# Patient Record
Sex: FEMALE | Race: WHITE
[De-identification: names, ages, dates, MRNs, and addresses within clinical notes are randomized per-mention and may not be internally consistent; named-entity substitution may affect disease eponyms.]

---

## 2020-04-17 ENCOUNTER — HOSPITAL ENCOUNTER (EMERGENCY)
Dept: HOSPITAL 35 - ER | Age: 55
Discharge: HOME | End: 2020-04-17
Payer: COMMERCIAL

## 2020-04-17 VITALS — SYSTOLIC BLOOD PRESSURE: 149 MMHG | DIASTOLIC BLOOD PRESSURE: 88 MMHG

## 2020-04-17 VITALS — HEIGHT: 63 IN | WEIGHT: 139.99 LBS | BODY MASS INDEX: 24.8 KG/M2

## 2020-04-17 VITALS — DIASTOLIC BLOOD PRESSURE: 72 MMHG | SYSTOLIC BLOOD PRESSURE: 129 MMHG

## 2020-04-17 DIAGNOSIS — Z79.899: ICD-10-CM

## 2020-04-17 DIAGNOSIS — Z98.890: ICD-10-CM

## 2020-04-17 DIAGNOSIS — M75.31: Primary | ICD-10-CM

## 2020-04-17 DIAGNOSIS — Z88.0: ICD-10-CM

## 2020-04-17 DIAGNOSIS — F17.210: ICD-10-CM

## 2020-04-17 DIAGNOSIS — I10: ICD-10-CM

## 2020-04-17 NOTE — EKG
[facility]
[place]   [address]                     ELECTROCARDIOGRAM REPORT      
_______________________________________________________________________________
 
Name:       LOW REZA              Room #:         170-9       ADM IN  
M.R.#:      [id number]                       Account #:      [id number]  
Admission:  20    Attend Phys:    Tomás Eric MD     
Discharge:              Date of Birth:  [birth year]  
                                                          Report #: [id number]
                                                                    [id number]
_______________________________________________________________________________
THIS REPORT FOR:  
 
cc:  Eloisa Berman MD Mcrae, Jennifer A, MD Couchonnal, Luis F. MD                                            ~
THIS REPORT FOR:   //name//                          
 
                         [facility] ED
                                       
Test Date:    2020               Test Time:    08:40:40
Pat Name:     LOW REZA             Department:   
Patient ID:   [id number]            Room:         170
Gender:       F                        Technician:   ANDREA
:          1965               Requested By: Brian Yeager
Order Number: [id number] MD:   Venancio Cifuentes
                                 Measurements
Intervals                              Axis          
Rate:         75                       P:            54
CT:           140                      QRS:          23
QRSD:         98                       T:            24
QT:           367                                    
QTc:          410                                    
                           Interpretive Statements
Sinus rhythm
Minimal ST depression, anterolateral leads
No previous ECG available for comparison
 
Electronically Signed On 2020 10:05:16 CDT by Venancio Cifuentes
[email or web address]
 
 
 
 
 
 
 
 
 
 
 
 
 
 
 
  <ELECTRONICALLY SIGNED>
   By: Venancio Cifuentes MD        
  20     1005
D: 20                           _____________________________________
T: 20                           Venancio Cifuentes MD          /EPI

## 2020-09-14 ENCOUNTER — HOSPITAL ENCOUNTER (EMERGENCY)
Dept: HOSPITAL 35 - ER | Age: 55
LOS: 1 days | Discharge: HOME | End: 2020-09-15
Payer: COMMERCIAL

## 2020-09-14 VITALS — BODY MASS INDEX: 24.63 KG/M2 | HEIGHT: 63 IN | WEIGHT: 139 LBS

## 2020-09-14 DIAGNOSIS — Z79.899: ICD-10-CM

## 2020-09-14 DIAGNOSIS — F17.210: ICD-10-CM

## 2020-09-14 DIAGNOSIS — Z98.890: ICD-10-CM

## 2020-09-14 DIAGNOSIS — Z88.0: ICD-10-CM

## 2020-09-14 DIAGNOSIS — R10.11: ICD-10-CM

## 2020-09-14 DIAGNOSIS — R19.7: ICD-10-CM

## 2020-09-14 DIAGNOSIS — R10.13: Primary | ICD-10-CM

## 2020-09-14 LAB
ALBUMIN SERPL-MCNC: 3.7 G/DL (ref 3.4–5)
ALT SERPL-CCNC: 35 U/L (ref 30–65)
ANION GAP SERPL CALC-SCNC: 12 MMOL/L (ref 7–16)
AST SERPL-CCNC: 24 U/L (ref 15–37)
BASOPHILS NFR BLD AUTO: 0.5 % (ref 0–2)
BILIRUB DIRECT SERPL-MCNC: < 0.1 MG/DL
BILIRUB SERPL-MCNC: 0.3 MG/DL (ref 0.2–1)
BILIRUB UR-MCNC: NEGATIVE MG/DL
BUN SERPL-MCNC: 15 MG/DL (ref 7–18)
CALCIUM SERPL-MCNC: 9.6 MG/DL (ref 8.5–10.1)
CHLORIDE SERPL-SCNC: 102 MMOL/L (ref 98–107)
CO2 SERPL-SCNC: 25 MMOL/L (ref 21–32)
COLOR UR: YELLOW
CREAT SERPL-MCNC: 0.8 MG/DL (ref 0.6–1)
EOSINOPHIL NFR BLD: 1.6 % (ref 0–3)
ERYTHROCYTE [DISTWIDTH] IN BLOOD BY AUTOMATED COUNT: 14.2 % (ref 10.5–14.5)
GLUCOSE SERPL-MCNC: 126 MG/DL (ref 74–106)
GRANULOCYTES NFR BLD MANUAL: 61.6 % (ref 36–66)
HCT VFR BLD CALC: 47.5 % (ref 37–47)
HGB BLD-MCNC: 15.8 GM/DL (ref 12–15)
KETONES UR STRIP-MCNC: NEGATIVE MG/DL
LYMPHOCYTES NFR BLD AUTO: 26.3 % (ref 24–44)
MCH RBC QN AUTO: 32.9 PG (ref 26–34)
MCHC RBC AUTO-ENTMCNC: 33.4 G/DL (ref 28–37)
MCV RBC: 98.5 FL (ref 80–100)
MONOCYTES NFR BLD: 10 % (ref 1–8)
NEUTROPHILS # BLD: 9.2 THOU/UL (ref 1.4–8.2)
PLATELET # BLD: 290 THOU/UL (ref 150–400)
POTASSIUM SERPL-SCNC: 3.8 MMOL/L (ref 3.5–5.1)
PROT SERPL-MCNC: 7.2 G/DL (ref 6.4–8.2)
RBC # BLD AUTO: 4.82 MIL/UL (ref 4.2–5)
RBC # UR STRIP: (no result) /UL
SODIUM SERPL-SCNC: 139 MMOL/L (ref 136–145)
SP GR UR STRIP: <= 1.005 (ref 1–1.03)
URINE CLARITY: CLEAR
URINE GLUCOSE-RANDOM*: NEGATIVE
URINE LEUKOCYTES-REFLEX: NEGATIVE
URINE NITRITE-REFLEX: NEGATIVE
URINE PROTEIN (DIPSTICK): (no result)
UROBILINOGEN UR STRIP-ACNC: 0.2 E.U./DL (ref 0.2–1)
WBC # BLD AUTO: 14.9 THOU/UL (ref 4–11)

## 2020-09-15 VITALS — DIASTOLIC BLOOD PRESSURE: 60 MMHG | SYSTOLIC BLOOD PRESSURE: 108 MMHG

## 2020-09-15 NOTE — EKG
[facility]
[place]   [address]                     ELECTROCARDIOGRAM REPORT      
_______________________________________________________________________________
 
Name:       LOW REZA              Room #:                     [facility]#:      [id number]                       Account #:      [id number]  
Admission:  20    Attend Phys:                          
Discharge:  09/15/20    Date of Birth:  [birth year]  
                                                          Report #: [id number]
                                                                    [id number]
_______________________________________________________________________________
THIS REPORT FOR:  
 
cc:  Eloisa Berman MD Mcrae, Jennifer A, MD Couchonnal, Luis F. MD                                            ~
THIS REPORT FOR:   //name//                          
 
                         [facility] ED
                                       
Test Date:    2020               Test Time:    22:45:51
Pat Name:     LOW REZA             Department:   
Patient ID:   [id number]            Room:          
Gender:       F                        Technician:   DONALDO
:          1965               Requested By: Tory Orr
Order Number: [id number] MD:   Venancio Cifuentes
                                 Measurements
Intervals                              Axis          
Rate:         72                       P:            46
OR:           143                      QRS:          41
QRSD:         98                       T:            54
QT:           402                                    
QTc:          440                                    
                           Interpretive Statements
Sinus rhythm
Borderline T abnormalities, anterior leads
Baseline wander in lead(s) V4
Compared to ECG 2020 08:40:40
T-wave abnormality now present
ST (T wave) deviation no longer present
Electronically Signed On 9- 8:18:34 CDT by Venancio Cifuentes
[email or web address]
 
 
 
 
 
 
 
 
 
 
 
 
 
  <ELECTRONICALLY SIGNED>
   By: Venancio Cifuentes MD        
  09/15/20     0818
D: 20                           _____________________________________
T: 20                           Venancio Cifuentes MD          /EPI